# Patient Record
Sex: FEMALE | Race: ASIAN | NOT HISPANIC OR LATINO | URBAN - METROPOLITAN AREA
[De-identification: names, ages, dates, MRNs, and addresses within clinical notes are randomized per-mention and may not be internally consistent; named-entity substitution may affect disease eponyms.]

---

## 2017-09-17 ENCOUNTER — EMERGENCY (EMERGENCY)
Facility: HOSPITAL | Age: 27
LOS: 1 days | Discharge: HOME | End: 2017-09-17
Attending: EMERGENCY MEDICINE
Payer: MEDICAID

## 2017-09-17 VITALS
WEIGHT: 110.01 LBS | SYSTOLIC BLOOD PRESSURE: 108 MMHG | TEMPERATURE: 98 F | HEIGHT: 61 IN | DIASTOLIC BLOOD PRESSURE: 63 MMHG | RESPIRATION RATE: 18 BRPM | OXYGEN SATURATION: 98 % | HEART RATE: 71 BPM

## 2017-09-17 DIAGNOSIS — R21 RASH AND OTHER NONSPECIFIC SKIN ERUPTION: ICD-10-CM

## 2017-09-17 PROCEDURE — 99282 EMERGENCY DEPT VISIT SF MDM: CPT

## 2017-09-17 PROCEDURE — 99283 EMERGENCY DEPT VISIT LOW MDM: CPT

## 2017-09-17 RX ORDER — DIPHENHYDRAMINE HCL 50 MG
25 CAPSULE ORAL ONCE
Qty: 0 | Refills: 0 | Status: COMPLETED | OUTPATIENT
Start: 2017-09-17 | End: 2017-09-17

## 2017-09-17 RX ADMIN — Medication 25 MILLIGRAM(S): at 13:41

## 2017-09-17 NOTE — ED ADULT NURSE NOTE - OBJECTIVE STATEMENT
26 y/o female with c/o rashes on her back and back after  sleeping over friends house last saturday pt + rash on her neck denies any shortness of breath

## 2017-09-17 NOTE — ED PROVIDER NOTE - OBJECTIVE STATEMENT
26 y/o female with no significant PMHx presents to the ED c/o itchy rash/bumps since yesterday morning. Pt states she was at friends place overnight 2 nights ago and next morning noticed scattered red bumps. Pt applied a cream which did not improve symptoms. Pt notes her friend has no pets and no rash herself. Pt denies fever, chills, swollen tongue, difficulty breathing, nausea, vomiting, or any other complaints. NKDA. 28 y/o female with no significant PMHx presents to the ED c/o itchy rash/bumps since yesterday morning. Pt states she was at friends place overnight 2 nights ago and next morning noticed scattered red itchy bumps. Pt applied benadryl cream which did not improve symptoms. Pt notes her friend has no pets and no rash herself. Pt denies fever, chills, swollen tongue, difficulty breathing, nausea, vomiting, or any other complaints. NKDA. This has occurred last time she spent the night at the same friend's place

## 2017-09-17 NOTE — ED PROVIDER NOTE - MEDICAL DECISION MAKING DETAILS
Pt with rash and hives, likely from insect bites. Will DC with benadryl and PCP follow up. Pt with rash and hives, likely from insect bites. Will DC with benadryl and PCP follow up. well appearing